# Patient Record
Sex: FEMALE | Race: WHITE | Employment: OTHER | URBAN - NONMETROPOLITAN AREA
[De-identification: names, ages, dates, MRNs, and addresses within clinical notes are randomized per-mention and may not be internally consistent; named-entity substitution may affect disease eponyms.]

---

## 2022-02-15 ENCOUNTER — OFFICE VISIT (OUTPATIENT)
Dept: CARDIOLOGY CLINIC | Age: 67
End: 2022-02-15
Payer: MEDICARE

## 2022-02-15 VITALS
DIASTOLIC BLOOD PRESSURE: 84 MMHG | SYSTOLIC BLOOD PRESSURE: 124 MMHG | BODY MASS INDEX: 34.21 KG/M2 | HEART RATE: 70 BPM | HEIGHT: 67 IN | WEIGHT: 218 LBS | OXYGEN SATURATION: 97 %

## 2022-02-15 DIAGNOSIS — R00.2 PALPITATIONS: Primary | ICD-10-CM

## 2022-02-15 DIAGNOSIS — R01.1 HEART MURMUR: ICD-10-CM

## 2022-02-15 PROCEDURE — 93242 EXT ECG>48HR<7D RECORDING: CPT | Performed by: INTERNAL MEDICINE

## 2022-02-15 PROCEDURE — 1090F PRES/ABSN URINE INCON ASSESS: CPT | Performed by: INTERNAL MEDICINE

## 2022-02-15 PROCEDURE — G8427 DOCREV CUR MEDS BY ELIG CLIN: HCPCS | Performed by: INTERNAL MEDICINE

## 2022-02-15 PROCEDURE — G8484 FLU IMMUNIZE NO ADMIN: HCPCS | Performed by: INTERNAL MEDICINE

## 2022-02-15 PROCEDURE — 93000 ELECTROCARDIOGRAM COMPLETE: CPT | Performed by: INTERNAL MEDICINE

## 2022-02-15 PROCEDURE — G8417 CALC BMI ABV UP PARAM F/U: HCPCS | Performed by: INTERNAL MEDICINE

## 2022-02-15 PROCEDURE — 99204 OFFICE O/P NEW MOD 45 MIN: CPT | Performed by: INTERNAL MEDICINE

## 2022-02-15 RX ORDER — SERTRALINE HYDROCHLORIDE 25 MG/1
25 TABLET, FILM COATED ORAL DAILY
COMMUNITY

## 2022-02-15 RX ORDER — CALCIUM CARBONATE 500(1250)
500 TABLET ORAL DAILY
COMMUNITY

## 2022-02-15 RX ORDER — FOLIC ACID 1 MG/1
1 TABLET ORAL
COMMUNITY

## 2022-02-15 RX ORDER — LEUCOVORIN CALCIUM 5 MG/1
5 TABLET ORAL WEEKLY
COMMUNITY

## 2022-02-15 RX ORDER — ACETAMINOPHEN 160 MG
TABLET,DISINTEGRATING ORAL DAILY
COMMUNITY

## 2022-02-15 RX ORDER — MULTIVIT WITH MINERALS/LUTEIN
250 TABLET ORAL DAILY
COMMUNITY
End: 2022-03-31 | Stop reason: ALTCHOICE

## 2022-02-16 PROBLEM — R00.2 PALPITATIONS: Status: ACTIVE | Noted: 2022-02-16

## 2022-02-16 PROBLEM — R01.1 HEART MURMUR: Status: ACTIVE | Noted: 2022-02-16

## 2022-02-16 ASSESSMENT — ENCOUNTER SYMPTOMS
ANAL BLEEDING: 0
COUGH: 1
VOMITING: 0
NAUSEA: 0
ABDOMINAL PAIN: 0
BLOOD IN STOOL: 0
BACK PAIN: 1
SHORTNESS OF BREATH: 0

## 2022-02-16 NOTE — PROGRESS NOTES
Office Visit  Faiza Talbot is a 73 y.o. female; who present today for New Patient      HPI  I am seeing this 56-year-old white female in office consultation today for the first time on referral from [facility] because of the finding of extrasystoles on several occasions. This patient was never done any cardiac problems in the past.  Basically she has almost no cardiovascular symptoms. She reports that during colonoscopy she was told that her heart was going casa, assuming that there were some irregularities. She was then seen by primary care and apparently on examination extrasellar noted, perhaps every eighth beat during auscultation was noted to be an extrasystole. The EKG from primary care office demonstrated what appeared to be sinus arrhythmia or perhaps PACs and otherwise was normal.  Lab studies were unremarkable performed this month and normal TSH in 2021. She is fairly active and experiences no chest discomfort or dyspnea, no presyncope or syncope. On occasion she may have a sense of a fluttering that is very brief with no associated symptoms. Her medical history is significant for rheumatoid arthritis. She has never required special cardiac testing in the past such as stress, echocardiography or cardiac catheterization.   Current Outpatient Medications   Medication Sig Dispense Refill    sertraline (ZOLOFT) 25 MG tablet Take 25 mg by mouth daily      methotrexate (RHEUMATREX) 2.5 MG chemo tablet Take by mouth once a week Take 5 tablets once a week, on Tuesday      leucovorin calcium (WELLCOVORIN) 5 MG tablet Take 5 mg by mouth once a week Take on Friday      folic acid (FOLVITE) 1 MG tablet Take 1 mg by mouth Take 1 tablet daily besides on Tuesday      Ascorbic Acid (VITAMIN C) 250 MG tablet Take 250 mg by mouth daily      Cholecalciferol (VITAMIN D3) 50 MCG (2000 UT) CAPS Take by mouth daily      calcium carbonate (OSCAL) 500 MG TABS tablet Take 500 mg by mouth daily No current facility-administered medications for this visit. There are no discontinued medications. No Known Allergies    Past Medical History:   Diagnosis Date    Hyperlipidemia     Borderline     Irregular heart beat     Rheumatic arteritis      Negative - Past Medical History for  Past Medical History Pertinent Negatives:   Diagnosis Date Noted    Cerebral artery occlusion with cerebral infarction ([facility] 02/15/2022    CHF (congestive heart failure) ([facility] 02/15/2022    Diabetes mellitus ([facility] 02/15/2022    Hypertension 02/15/2022    Seizures ([facility] 02/15/2022       Past Surgical History:   Procedure Laterality Date    BREAST LUMPECTOMY Right     CARPAL TUNNEL RELEASE Bilateral     COLONOSCOPY      HYSTERECTOMY, TOTAL ABDOMINAL      RECTAL PROLAPSE REPAIR      TONSILLECTOMY       Social History     Occupational History    Not on file   Tobacco Use    Smoking status: Never Smoker    Smokeless tobacco: Never Used   Vaping Use    Vaping Use: Never used   Substance and Sexual Activity    Alcohol use: Not Currently     Comment: rare     Drug use: Not Currently    Sexual activity: Not on file        Family History   Problem Relation Age of Onset    Heart Failure Mother     Heart Attack Mother     Hypertension Mother        Review of Systems  Review of Systems   Constitutional: Negative for fatigue and fever. Respiratory: Positive for cough. Negative for shortness of breath. Gastrointestinal: Negative for abdominal pain, anal bleeding, blood in stool, nausea and vomiting. Genitourinary: Negative for dysuria and hematuria. Musculoskeletal: Positive for arthralgias and back pain. Skin: Negative for rash. Neurological: Negative for facial asymmetry, weakness and headaches. All other systems reviewed and are negative.         Physical Exam  /84   Pulse 70   Ht 5' 7\" (1.702 m)   Wt 218 lb (98.9 kg)   SpO2 97%   BMI 34.14 kg/m²    Physical Exam  Constitutional: Appearance: She is obese. Cardiovascular:      Heart sounds: Murmur heard. Systolic (Early, second costal space) murmur is present with a grade of 1/6. Assessment/Plan    EKG Findings:  Sinus rhythm, 70 bpm, within normal limits    Problem List Items Addressed This Visit     Palpitations - Primary    Relevant Orders    EKG 12 lead (Completed)    ECHO Complete 2D W Doppler W Color    [place] EXTERNAL ECG REC>48HR<7D RECORDING (Completed)    Heart murmur           Diagnosis Orders   1. Palpitations  EKG 12 lead    ECHO Complete 2D W Doppler W Color    [place] EXTERNAL ECG REC>48HR<7D RECORDING    Primarily extrasystoles by examination and monitoring during colonoscopy   2. Heart murmur         Recommendations:   Diet: Low-fat, calorie reduced  Activity: Normal activities  Medication Changes: No medication change    This patient is examined is remarkable. I auscultated her heart for a long period of time and heard only one extrasystole. It is likely that she has PACs, possibly PVCs also and probably benign but needs to evaluate for underlying structural heart disease. She has nothing to suggest ischemic heart disease. I will get an echocardiogram and a 5-day ZIO monitor with further recommendations pending the results. No orders of the defined types were placed in this encounter. Orders Placed This Encounter   Procedures    EKG 12 lead     Order Specific Question:   Reason for Exam?     Answer:   Irregular heart rate    ECHO Complete 2D W Doppler W Color     Standing Status:   Future     Standing Expiration Date:   2/15/2023     Order Specific Question:   Reason for exam:     Answer:   palpitations    [place] EXTERNAL ECG REC>48HR<7D RECORDING       Return in about 6 weeks (around 3/29/2022) for me, test results.

## 2022-02-23 ENCOUNTER — HOSPITAL ENCOUNTER (OUTPATIENT)
Dept: NON INVASIVE DIAGNOSTICS | Age: 67
Discharge: HOME | End: 2022-02-23
Payer: MEDICARE

## 2022-02-23 DIAGNOSIS — R00.2 PALPITATIONS: ICD-10-CM

## 2022-02-23 LAB
LV EF: 58 %
LVEF MODALITY: NORMAL

## 2022-02-23 PROCEDURE — C8929 TTE W OR WO FOL WCON,DOPPLER: HCPCS

## 2022-02-23 PROCEDURE — 6360000004 HC RX CONTRAST MEDICATION: Performed by: INTERNAL MEDICINE

## 2022-02-23 RX ADMIN — PERFLUTREN 1.5 ML: 6.52 INJECTION, SUSPENSION INTRAVENOUS at 14:28

## 2022-03-01 ENCOUNTER — TELEPHONE (OUTPATIENT)
Dept: CARDIOLOGY CLINIC | Age: 67
End: 2022-03-01

## 2022-03-01 NOTE — TELEPHONE ENCOUNTER
----- Message from Jason White DO sent at 3/1/2022 10:40 AM CST -----  Please notify patient that her ZIO monitor demonstrated some extra heartbeats and short runs of fast heartbeat that correlate to some extent with her symptoms and I will discuss in more detail at the time of the follow-up visit.

## 2022-03-31 ENCOUNTER — OFFICE VISIT (OUTPATIENT)
Dept: CARDIOLOGY CLINIC | Age: 67
End: 2022-03-31
Payer: MEDICARE

## 2022-03-31 VITALS
OXYGEN SATURATION: 98 % | DIASTOLIC BLOOD PRESSURE: 82 MMHG | HEART RATE: 68 BPM | HEIGHT: 67 IN | BODY MASS INDEX: 35.63 KG/M2 | SYSTOLIC BLOOD PRESSURE: 116 MMHG | WEIGHT: 227 LBS

## 2022-03-31 DIAGNOSIS — E66.01 MORBID OBESITY (HCC): ICD-10-CM

## 2022-03-31 DIAGNOSIS — R00.2 PALPITATIONS: Primary | ICD-10-CM

## 2022-03-31 DIAGNOSIS — R01.1 HEART MURMUR: ICD-10-CM

## 2022-03-31 PROCEDURE — 1090F PRES/ABSN URINE INCON ASSESS: CPT | Performed by: INTERNAL MEDICINE

## 2022-03-31 PROCEDURE — G8400 PT W/DXA NO RESULTS DOC: HCPCS | Performed by: INTERNAL MEDICINE

## 2022-03-31 PROCEDURE — 1123F ACP DISCUSS/DSCN MKR DOCD: CPT | Performed by: INTERNAL MEDICINE

## 2022-03-31 PROCEDURE — 1036F TOBACCO NON-USER: CPT | Performed by: INTERNAL MEDICINE

## 2022-03-31 PROCEDURE — G8484 FLU IMMUNIZE NO ADMIN: HCPCS | Performed by: INTERNAL MEDICINE

## 2022-03-31 PROCEDURE — 4040F PNEUMOC VAC/ADMIN/RCVD: CPT | Performed by: INTERNAL MEDICINE

## 2022-03-31 PROCEDURE — 3017F COLORECTAL CA SCREEN DOC REV: CPT | Performed by: INTERNAL MEDICINE

## 2022-03-31 PROCEDURE — G8417 CALC BMI ABV UP PARAM F/U: HCPCS | Performed by: INTERNAL MEDICINE

## 2022-03-31 PROCEDURE — G8427 DOCREV CUR MEDS BY ELIG CLIN: HCPCS | Performed by: INTERNAL MEDICINE

## 2022-03-31 PROCEDURE — 99213 OFFICE O/P EST LOW 20 MIN: CPT | Performed by: INTERNAL MEDICINE

## 2022-03-31 RX ORDER — M-VIT,TX,IRON,MINS/CALC/FOLIC 27MG-0.4MG
1 TABLET ORAL DAILY
COMMUNITY

## 2022-03-31 ASSESSMENT — ENCOUNTER SYMPTOMS
SHORTNESS OF BREATH: 0
COUGH: 0
ANAL BLEEDING: 0
BLOOD IN STOOL: 0

## 2022-03-31 NOTE — PROGRESS NOTES
Office Visit  Faiza Talbot is a 73 y.o. female; who present today for Results      HPI  I am seeing this 73-year-old white female in follow-up after having the echocardiogram and ZIO monitor as I ordered based upon the initial visit in this office, seen because extrasystoles were noted on examination and the patient seemed to have some palpitations. I reviewed the results of the testing. Her echocardiogram is basically normal except for borderline dilated left ventricle that could be the result of increased cardiac demand because of obesity but essentially no structural heart disease and nothing to suggest valvular disease or congenital disease to account for her heart murmur. The ZIO monitor for over 5 days demonstrated occasional PVCs at 3.4% with rare PACs, 25 runs of SVT up to about 9 seconds and several short runs of ventricular tachycardia. She had some symptoms with SVT and PVCs but these are infrequent and not very troublesome. She basically feels well with no chest pain or dyspnea, no presyncope or syncope. Current Outpatient Medications   Medication Sig Dispense Refill    Multiple Vitamins-Minerals (THERAPEUTIC MULTIVITAMIN-MINERALS) tablet Take 1 tablet by mouth daily      sertraline (ZOLOFT) 25 MG tablet Take 25 mg by mouth daily      methotrexate (RHEUMATREX) 2.5 MG chemo tablet Take by mouth once a week Take 5 tablets once a week, on Tuesday      leucovorin calcium (WELLCOVORIN) 5 MG tablet Take 5 mg by mouth once a week Take on Friday      folic acid (FOLVITE) 1 MG tablet Take 1 mg by mouth Take 1 tablet daily besides on Tuesday      Cholecalciferol (VITAMIN D3) 50 MCG (2000 UT) CAPS Take by mouth daily      calcium carbonate (OSCAL) 500 MG TABS tablet Take 500 mg by mouth daily       No current facility-administered medications for this visit.       Medications Discontinued During This Encounter   Medication Reason    Ascorbic Acid (VITAMIN C) 250 MG tablet DISCONTINUED BY ANOTHER CLINICIAN        No Known Allergies    Past Medical History:   Diagnosis Date    Hyperlipidemia     Borderline     Irregular heart beat     Rheumatic arteritis      Negative - Past Medical History for  Past Medical History Pertinent Negatives:   Diagnosis Date Noted    Cerebral artery occlusion with cerebral infarction ([facility] 02/15/2022    CHF (congestive heart failure) ([facility] 02/15/2022    Diabetes mellitus ([facility] 02/15/2022    Hypertension 02/15/2022    Seizures ([facility] 02/15/2022       Past Surgical History:   Procedure Laterality Date    BREAST LUMPECTOMY Right     CARPAL TUNNEL RELEASE Bilateral     COLONOSCOPY      HYSTERECTOMY, TOTAL ABDOMINAL      RECTAL PROLAPSE REPAIR      TONSILLECTOMY       Social History     Occupational History    Not on file   Tobacco Use    Smoking status: Never Smoker    Smokeless tobacco: Never Used   Vaping Use    Vaping Use: Never used   Substance and Sexual Activity    Alcohol use: Not Currently     Comment: rare     Drug use: Not Currently    Sexual activity: Not on file        Family History   Problem Relation Age of Onset    Heart Failure Mother     Heart Attack Mother     Hypertension Mother        Review of Systems  Review of Systems   Constitutional: Negative for fatigue and fever. Respiratory: Negative for cough and shortness of breath. Cardiovascular: Positive for palpitations. Negative for chest pain and leg swelling. Gastrointestinal: Negative for anal bleeding and blood in stool. Neurological: Negative for syncope, facial asymmetry and speech difficulty. Physical Exam  /82   Pulse 68   Ht 5' 7\" (1.702 m)   Wt 227 lb (103 kg)   SpO2 98%   BMI 35.55 kg/m²    Physical Exam  Constitutional:       Appearance: Normal appearance. She is morbidly obese. Cardiovascular:      Rate and Rhythm: Normal rate and regular rhythm. Heart sounds: Murmur heard.     Systolic (Early, base of the heart) murmur is present with a grade of 1/6.  No gallop. Pulmonary:      Effort: Pulmonary effort is normal.      Breath sounds: Normal breath sounds. Abdominal:      General: Abdomen is flat. Bowel sounds are normal.      Palpations: Abdomen is soft. Musculoskeletal:         General: No swelling. Skin:     General: Skin is warm and dry. Neurological:      Mental Status: She is alert. Assessment/Plan    EKG Findings:  Not performed today    Problem List Items Addressed This Visit     Palpitations - Primary    Heart murmur    Morbid obesity ([address]           Diagnosis Orders   1. Palpitations      Associated with SVT and PVCs on ZIO monitor   2. Heart murmur      Flow murmur, borderline left ventricular dilation on echo   3. Morbid obesity ([address]         Recommendations:   Diet: Calorie reduced diet to lose weight  Activity: Normal activities  Medication Changes: No medication change    This patient's or not bothersome enough to consider taking beta-blockers but this is an option if her symptoms are more troublesome but if they get worse then she should be seen regarding any change. I have asked her to return in 6 months to see how she is doing and to consider whether an echocardiogram should be repeated in a year. No orders of the defined types were placed in this encounter. No orders of the defined types were placed in this encounter. Return in about 6 months (around 9/30/2022) for APRN, routine.

## 2022-10-03 ENCOUNTER — OFFICE VISIT (OUTPATIENT)
Dept: CARDIOLOGY CLINIC | Age: 67
End: 2022-10-03
Payer: MEDICARE

## 2022-10-03 VITALS
BODY MASS INDEX: 35.31 KG/M2 | HEART RATE: 61 BPM | OXYGEN SATURATION: 98 % | WEIGHT: 225 LBS | HEIGHT: 67 IN | DIASTOLIC BLOOD PRESSURE: 84 MMHG | SYSTOLIC BLOOD PRESSURE: 118 MMHG

## 2022-10-03 DIAGNOSIS — I25.10 CORONARY ARTERY DISEASE INVOLVING NATIVE HEART WITHOUT ANGINA PECTORIS, UNSPECIFIED VESSEL OR LESION TYPE: Primary | ICD-10-CM

## 2022-10-03 PROCEDURE — 1123F ACP DISCUSS/DSCN MKR DOCD: CPT | Performed by: INTERNAL MEDICINE

## 2022-10-03 PROCEDURE — 3017F COLORECTAL CA SCREEN DOC REV: CPT | Performed by: INTERNAL MEDICINE

## 2022-10-03 PROCEDURE — 1090F PRES/ABSN URINE INCON ASSESS: CPT | Performed by: INTERNAL MEDICINE

## 2022-10-03 PROCEDURE — 1036F TOBACCO NON-USER: CPT | Performed by: INTERNAL MEDICINE

## 2022-10-03 PROCEDURE — G8484 FLU IMMUNIZE NO ADMIN: HCPCS | Performed by: INTERNAL MEDICINE

## 2022-10-03 PROCEDURE — G8427 DOCREV CUR MEDS BY ELIG CLIN: HCPCS | Performed by: INTERNAL MEDICINE

## 2022-10-03 PROCEDURE — G8417 CALC BMI ABV UP PARAM F/U: HCPCS | Performed by: INTERNAL MEDICINE

## 2022-10-03 PROCEDURE — 99214 OFFICE O/P EST MOD 30 MIN: CPT | Performed by: INTERNAL MEDICINE

## 2022-10-03 PROCEDURE — G8400 PT W/DXA NO RESULTS DOC: HCPCS | Performed by: INTERNAL MEDICINE

## 2022-10-03 ASSESSMENT — ENCOUNTER SYMPTOMS
APNEA: 0
ABDOMINAL DISTENTION: 0
SHORTNESS OF BREATH: 0
CHEST TIGHTNESS: 0
EYE DISCHARGE: 0
NAUSEA: 0
EYE REDNESS: 0
CONSTIPATION: 0
ABDOMINAL PAIN: 0
WHEEZING: 0
EYE PAIN: 0
BLOOD IN STOOL: 0
FACIAL SWELLING: 0
SORE THROAT: 0
DIARRHEA: 0
COUGH: 0
VOMITING: 0

## 2022-10-03 NOTE — PROGRESS NOTES
Cardiology Office Visit Note  [address]  [address]  Phone: [phone]  Fax: [phone]                            Date:  10/3/2022  Patient: Letitia Banerjee  Age:  73 y. o., 1955    Referral: No ref. provider found    REASON FOR VISIT:  Establish Cardiologist ([place] )         PROBLEM LIST:    Patient Active Problem List    Diagnosis Date Noted    Morbid obesity ([facility] 03/31/2022    Palpitations 02/16/2022     Overview Note:     Primarily extrasystoles by examination and monitoring during colonoscopy      Heart murmur 02/16/2022         PRESENTATION: Letitia Banerjee is a 73y.o. year old female was last seen in this office approximately 6 months ago. She has been managed for palpitations. She is known to have borderline dilated left ventricle related to cardiac obesity. Her last known ejection fraction  normal.  Previous ZIO monitor notable for occasional PVCs with 3.4% burden and bursts of paroxysmal supraventricular tachycardia and several short runs of ventricular tachycardia. Is offered beta-blocker however he declined. Today she reports that she still has palpitations on occasion, random in onset, mostly observed during quiet times. No chest pain, shortness of breath or diaphoresis. She does have some degree of chronic fatigue related to rheumatoid arthritis. REVIEW OF SYSTEMS:  Review of Systems   Constitutional:  Negative for chills, fatigue and fever. HENT:  Negative for congestion, facial swelling, hearing loss and sore throat. Eyes:  Negative for pain, discharge, redness and visual disturbance. Respiratory:  Negative for apnea, cough, chest tightness, shortness of breath and wheezing. Cardiovascular:  Negative for chest pain, palpitations and leg swelling. Gastrointestinal:  Negative for abdominal distention, abdominal pain, blood in stool, constipation, diarrhea, nausea and vomiting.    Endocrine: Negative for polydipsia, polyphagia and polyuria. Genitourinary:  Negative for dysuria, flank pain, frequency and hematuria. Musculoskeletal:  Negative for joint swelling, myalgias and neck pain. Skin:  Negative for pallor and rash. Neurological:  Negative for dizziness, syncope, speech difficulty, light-headedness, numbness and headaches. Psychiatric/Behavioral:  Negative for confusion, hallucinations and sleep disturbance. Past Medical History:      Diagnosis Date    Hyperlipidemia     Borderline     Irregular heart beat     Rheumatic arteritis        Past Surgical History:      Procedure Laterality Date    BREAST LUMPECTOMY Right     CARPAL TUNNEL RELEASE Bilateral     COLONOSCOPY      HYSTERECTOMY, TOTAL ABDOMINAL (CERVIX REMOVED)      RECTAL PROLAPSE REPAIR      TONSILLECTOMY         Medications:  Current Outpatient Medications   Medication Sig Dispense Refill    Multiple Vitamins-Minerals (THERAPEUTIC MULTIVITAMIN-MINERALS) tablet Take 1 tablet by mouth daily      sertraline (ZOLOFT) 25 MG tablet Take 25 mg by mouth daily      methotrexate (RHEUMATREX) 2.5 MG chemo tablet Take by mouth once a week Take 5 tablets once a week, on Tuesday      leucovorin calcium (WELLCOVORIN) 5 MG tablet Take 5 mg by mouth once a week Take on Friday      folic acid (FOLVITE) 1 MG tablet Take 1 mg by mouth Take 1 tablet daily besides on Tuesday      Cholecalciferol (VITAMIN D3) 50 MCG (2000 UT) CAPS Take by mouth daily      calcium carbonate (OSCAL) 500 MG TABS tablet Take 500 mg by mouth daily       No current facility-administered medications for this visit. Allergies:  Patient has no known allergies.     Social History:  Social History     Occupational History    Not on file   Tobacco Use    Smoking status: Never    Smokeless tobacco: Never   Vaping Use    Vaping Use: Never used   Substance and Sexual Activity    Alcohol use: Not Currently     Comment: rare     Drug use: Not Currently    Sexual activity: Not on file         Family History: Problem Relation Age of Onset    Heart Failure Mother     Heart Attack Mother     Hypertension Mother          Physical Examination:  /84   Pulse 61   Ht 5' 7\" (1.702 m)   Wt 225 lb (102.1 kg)   SpO2 98%   BMI 35.24 kg/m²   Physical Exam  Vitals reviewed. Constitutional:       General: She is not in acute distress. Appearance: Normal appearance. She is not ill-appearing, toxic-appearing or diaphoretic. HENT:      Head: Normocephalic and atraumatic. Eyes:      General: No scleral icterus. Right eye: No discharge. Left eye: No discharge. Conjunctiva/sclera: Conjunctivae normal.   Neck:      Vascular: No carotid bruit. Cardiovascular:      Rate and Rhythm: Normal rate and regular rhythm. No extrasystoles are present. Chest Wall: PMI is not displaced. No thrill. Heart sounds: S1 normal and S2 normal. No murmur heard. No friction rub. No gallop. Pulmonary:      Effort: Pulmonary effort is normal. No tachypnea or respiratory distress. Breath sounds: Normal breath sounds. No stridor. No wheezing, rhonchi or rales. Chest:      Chest wall: No tenderness. Abdominal:      General: Bowel sounds are normal. There is no distension. Palpations: Abdomen is soft. There is no mass. Tenderness: There is no abdominal tenderness. There is no guarding. Musculoskeletal:         General: No swelling. Cervical back: Normal range of motion and neck supple. No rigidity. Right lower leg: No edema. Left lower leg: No edema. Skin:     General: Skin is warm and dry. Coloration: Skin is not jaundiced. Findings: No erythema or rash. Neurological:      General: No focal deficit present. Mental Status: She is alert and oriented to person, place, and time. Mental status is at baseline. Psychiatric:         Mood and Affect: Mood normal.         Behavior: Behavior normal.         Thought Content:  Thought content normal. ASSESSMENT and PLAN:    Symptomatic PVCs  Rare runs of ventricular tachycardia, no history of syncope  Paroxysmal bursts of SVT  Last known ejection fraction within normal limits  Obtain most recent lab reports include electrolytes and TSH  CT calcium score for further risk analysis  Further recommendations will follow                Electronically signed by Dayami Webb MD on 10/3/2022 at 10:02 AM    Dayami Webb MD, MBA, [facility]  Noninvasive Cardiology Consultant    This dictation was generated by voice recognition computer software. Although all attempts are made to edit the dictation for accuracy, there may be errors in the transcription that are not intended.

## 2022-11-03 ENCOUNTER — HOSPITAL ENCOUNTER (OUTPATIENT)
Dept: CT IMAGING | Age: 67
Discharge: HOME | End: 2022-11-03
Payer: MEDICARE

## 2022-11-03 DIAGNOSIS — I25.10 CORONARY ARTERY DISEASE INVOLVING NATIVE HEART WITHOUT ANGINA PECTORIS, UNSPECIFIED VESSEL OR LESION TYPE: ICD-10-CM

## 2022-11-03 PROCEDURE — 75571 CT HRT W/O DYE W/CA TEST: CPT

## 2022-11-08 NOTE — RESULT ENCOUNTER NOTE
Mild plaque burden with a coronary calcium score of 15. Would emphasize weight loss by way of heart healthy dieting and exercise (BMI 35, body weight 225 pounds).

## 2022-11-09 ENCOUNTER — TELEPHONE (OUTPATIENT)
Dept: CARDIOLOGY CLINIC | Age: 67
End: 2022-11-09

## 2023-08-15 ENCOUNTER — OFFICE VISIT (OUTPATIENT)
Dept: CARDIOLOGY CLINIC | Age: 68
End: 2023-08-15
Payer: MEDICARE

## 2023-08-15 VITALS
HEART RATE: 63 BPM | HEIGHT: 67 IN | SYSTOLIC BLOOD PRESSURE: 152 MMHG | DIASTOLIC BLOOD PRESSURE: 84 MMHG | BODY MASS INDEX: 34.69 KG/M2 | WEIGHT: 221 LBS

## 2023-08-15 DIAGNOSIS — I25.10 CORONARY ARTERY DISEASE INVOLVING NATIVE HEART WITHOUT ANGINA PECTORIS, UNSPECIFIED VESSEL OR LESION TYPE: ICD-10-CM

## 2023-08-15 DIAGNOSIS — R00.2 PALPITATIONS: Primary | ICD-10-CM

## 2023-08-15 PROCEDURE — 93000 ELECTROCARDIOGRAM COMPLETE: CPT | Performed by: INTERNAL MEDICINE

## 2023-08-15 PROCEDURE — G8400 PT W/DXA NO RESULTS DOC: HCPCS | Performed by: INTERNAL MEDICINE

## 2023-08-15 PROCEDURE — 99214 OFFICE O/P EST MOD 30 MIN: CPT | Performed by: INTERNAL MEDICINE

## 2023-08-15 PROCEDURE — 3017F COLORECTAL CA SCREEN DOC REV: CPT | Performed by: INTERNAL MEDICINE

## 2023-08-15 PROCEDURE — G8417 CALC BMI ABV UP PARAM F/U: HCPCS | Performed by: INTERNAL MEDICINE

## 2023-08-15 PROCEDURE — G8427 DOCREV CUR MEDS BY ELIG CLIN: HCPCS | Performed by: INTERNAL MEDICINE

## 2023-08-15 PROCEDURE — 1090F PRES/ABSN URINE INCON ASSESS: CPT | Performed by: INTERNAL MEDICINE

## 2023-08-15 PROCEDURE — 1123F ACP DISCUSS/DSCN MKR DOCD: CPT | Performed by: INTERNAL MEDICINE

## 2023-08-15 PROCEDURE — 1036F TOBACCO NON-USER: CPT | Performed by: INTERNAL MEDICINE

## 2023-08-15 RX ORDER — SIMVASTATIN 10 MG
10 TABLET ORAL NIGHTLY
COMMUNITY

## 2023-08-15 ASSESSMENT — ENCOUNTER SYMPTOMS
APNEA: 0
VOMITING: 0
CHEST TIGHTNESS: 0
SHORTNESS OF BREATH: 0
ABDOMINAL PAIN: 0
WHEEZING: 0
ABDOMINAL DISTENTION: 0
FACIAL SWELLING: 0
CONSTIPATION: 0
EYE PAIN: 0
SORE THROAT: 0
DIARRHEA: 0
EYE REDNESS: 0
NAUSEA: 0
BLOOD IN STOOL: 0
COUGH: 0
EYE DISCHARGE: 0

## 2023-08-31 ENCOUNTER — SCHEDULED TELEPHONE ENCOUNTER (OUTPATIENT)
Dept: CARDIOLOGY CLINIC | Age: 68
End: 2023-08-31

## 2023-08-31 DIAGNOSIS — I10 PRIMARY HYPERTENSION: Primary | ICD-10-CM

## 2023-08-31 RX ORDER — HYDROCHLOROTHIAZIDE 12.5 MG/1
12.5 CAPSULE, GELATIN COATED ORAL EVERY MORNING
Qty: 30 CAPSULE | Refills: 3 | Status: SHIPPED | OUTPATIENT
Start: 2023-08-31

## 2023-08-31 ASSESSMENT — ENCOUNTER SYMPTOMS
FACIAL SWELLING: 0
EYE REDNESS: 0
ABDOMINAL PAIN: 0
COUGH: 0
EYE PAIN: 0
NAUSEA: 0
DIARRHEA: 0
BLOOD IN STOOL: 0
ABDOMINAL DISTENTION: 0
WHEEZING: 0
CHEST TIGHTNESS: 0
CONSTIPATION: 0
SHORTNESS OF BREATH: 0
SORE THROAT: 0
VOMITING: 0
APNEA: 0
EYE DISCHARGE: 0

## 2023-08-31 NOTE — PROGRESS NOTES
Cardiology Office Visit Note  [address], [address] [address]  Phone: [phone]  Fax: [phone]                            Date:  8/31/2023  Patient: Roberto Aceves  Age:  78 y.o., 1955    Referral: No ref. provider found    REASON FOR VISIT:  Follow-up  Elevated blood pressure      PROBLEM LIST:    Patient Active Problem List    Diagnosis Date Noted    Morbid obesity ([address]) 03/31/2022    Palpitations 02/16/2022     Overview Note:     Primarily extrasystoles by examination and monitoring during colonoscopy        Heart murmur 02/16/2022         PRESENTATION: Roberto Aceves is a 78y.o. year old female was evaluated today via telephone services for further management of elevated blood pressure. At the last office visit patient's blood pressure was noted to be moderately high. She has no prior history of hypertension. She was asked to monitor her blood pressure at home over the next few weeks. Home blood pressure monitoring log has confirmed mildly elevated hypertension with average systolic blood pressure in the 1 40-1 50 range and normal diastolic pressures. Isolated high of 153/91 and low of 129/78 with average heart rate 60 to 83 bpm.  She remains asymptomatic. She has been increasing her exercise and continues to maintain a low-sodium diet. REVIEW OF SYSTEMS:  Review of Systems   Constitutional:  Negative for chills, fatigue and fever. HENT:  Negative for congestion, facial swelling, hearing loss and sore throat. Eyes:  Negative for pain, discharge, redness and visual disturbance. Respiratory:  Negative for apnea, cough, chest tightness, shortness of breath and wheezing. Cardiovascular:  Negative for chest pain, palpitations and leg swelling. Gastrointestinal:  Negative for abdominal distention, abdominal pain, blood in stool, constipation, diarrhea, nausea and vomiting. Endocrine: Negative for polydipsia, polyphagia and polyuria.    Genitourinary:

## 2023-10-26 ENCOUNTER — OFFICE VISIT (OUTPATIENT)
Dept: CARDIOLOGY CLINIC | Age: 68
End: 2023-10-26

## 2023-10-26 VITALS
DIASTOLIC BLOOD PRESSURE: 90 MMHG | WEIGHT: 215 LBS | SYSTOLIC BLOOD PRESSURE: 146 MMHG | HEIGHT: 67 IN | HEART RATE: 92 BPM | BODY MASS INDEX: 33.74 KG/M2

## 2023-10-26 DIAGNOSIS — I10 PRIMARY HYPERTENSION: Primary | ICD-10-CM

## 2023-10-26 RX ORDER — HYDROCHLOROTHIAZIDE 12.5 MG/1
12.5 CAPSULE, GELATIN COATED ORAL EVERY MORNING
Qty: 30 CAPSULE | Refills: 3 | Status: SHIPPED | OUTPATIENT
Start: 2023-10-26

## 2023-10-26 NOTE — PROGRESS NOTES
Cardiology Office Visit Note  [address], [address] [address]  Phone: [phone]  Fax: [phone]                            Date:  10/26/2023  Patient: Bertrand Theodore  Age:  78 y.o., 1955    Referral: No ref. provider found    REASON FOR VISIT:  Follow-up         PROBLEM LIST:    Patient Active Problem List    Diagnosis Date Noted    Morbid obesity ([address]) 03/31/2022    Palpitations 02/16/2022     Overview Note:     Primarily extrasystoles by examination and monitoring during colonoscopy      Heart murmur 02/16/2022         PRESENTATION: Bertrand Theodore is a 78y.o. year old female is seen today for routine cardiology follow-up appointment. Patient's last appointment was in August of this year. She is known to have essential hypertension. Her blood pressure at home has been averaging 925 systolic and 70 diastolic. No new chest pain, shortness of breath, diaphoresis or fatigue. She has been increasing her physical activity at least 2-3 times per week, 15 to 20-minute sessions usually including yard work. She is compliant with her medications which include low-dose hydrochlorothiazide and simvastatin. REVIEW OF SYSTEMS:  Review of Systems   Constitutional:  Negative for chills, fatigue and fever. HENT:  Negative for congestion, facial swelling, hearing loss and sore throat. Eyes:  Negative for pain, discharge, redness and visual disturbance. Respiratory:  Negative for apnea, cough, chest tightness, shortness of breath and wheezing. Cardiovascular:  Negative for chest pain, palpitations and leg swelling. Gastrointestinal:  Negative for abdominal distention, abdominal pain, blood in stool, constipation, diarrhea, nausea and vomiting. Endocrine: Negative for polydipsia, polyphagia and polyuria. Genitourinary:  Negative for dysuria, flank pain, frequency and hematuria. Musculoskeletal:  Negative for joint swelling, myalgias and neck pain.    Skin:  Negative for

## 2023-10-27 ASSESSMENT — ENCOUNTER SYMPTOMS
DIARRHEA: 0
COUGH: 0
WHEEZING: 0
APNEA: 0
CHEST TIGHTNESS: 0
EYE PAIN: 0
SORE THROAT: 0
EYE DISCHARGE: 0
ABDOMINAL PAIN: 0
FACIAL SWELLING: 0
CONSTIPATION: 0
SHORTNESS OF BREATH: 0
NAUSEA: 0
VOMITING: 0
EYE REDNESS: 0
ABDOMINAL DISTENTION: 0
BLOOD IN STOOL: 0

## 2024-01-24 RX ORDER — HYDROCHLOROTHIAZIDE 12.5 MG/1
12.5 CAPSULE, GELATIN COATED ORAL EVERY MORNING
Qty: 90 CAPSULE | Refills: 3 | Status: SHIPPED | OUTPATIENT
Start: 2024-01-24 | End: 2024-01-25 | Stop reason: SDUPTHER

## 2024-01-25 RX ORDER — HYDROCHLOROTHIAZIDE 12.5 MG/1
12.5 CAPSULE, GELATIN COATED ORAL EVERY MORNING
Qty: 90 CAPSULE | Refills: 3 | Status: SHIPPED | OUTPATIENT
Start: 2024-01-25

## 2024-04-25 ENCOUNTER — OFFICE VISIT (OUTPATIENT)
Dept: CARDIOLOGY CLINIC | Age: 69
End: 2024-04-25
Payer: MEDICARE

## 2024-04-25 VITALS
DIASTOLIC BLOOD PRESSURE: 66 MMHG | HEIGHT: 67 IN | HEART RATE: 73 BPM | WEIGHT: 213 LBS | BODY MASS INDEX: 33.43 KG/M2 | SYSTOLIC BLOOD PRESSURE: 130 MMHG | OXYGEN SATURATION: 97 %

## 2024-04-25 DIAGNOSIS — I10 PRIMARY HYPERTENSION: Primary | ICD-10-CM

## 2024-04-25 PROCEDURE — 99214 OFFICE O/P EST MOD 30 MIN: CPT | Performed by: INTERNAL MEDICINE

## 2024-04-25 PROCEDURE — 3075F SYST BP GE 130 - 139MM HG: CPT | Performed by: INTERNAL MEDICINE

## 2024-04-25 PROCEDURE — 1090F PRES/ABSN URINE INCON ASSESS: CPT | Performed by: INTERNAL MEDICINE

## 2024-04-25 PROCEDURE — G8427 DOCREV CUR MEDS BY ELIG CLIN: HCPCS | Performed by: INTERNAL MEDICINE

## 2024-04-25 PROCEDURE — G8400 PT W/DXA NO RESULTS DOC: HCPCS | Performed by: INTERNAL MEDICINE

## 2024-04-25 PROCEDURE — 1036F TOBACCO NON-USER: CPT | Performed by: INTERNAL MEDICINE

## 2024-04-25 PROCEDURE — G8417 CALC BMI ABV UP PARAM F/U: HCPCS | Performed by: INTERNAL MEDICINE

## 2024-04-25 PROCEDURE — 3078F DIAST BP <80 MM HG: CPT | Performed by: INTERNAL MEDICINE

## 2024-04-25 PROCEDURE — 1123F ACP DISCUSS/DSCN MKR DOCD: CPT | Performed by: INTERNAL MEDICINE

## 2024-04-25 PROCEDURE — 3017F COLORECTAL CA SCREEN DOC REV: CPT | Performed by: INTERNAL MEDICINE

## 2024-04-25 RX ORDER — PHENOL 1.4 %
2 AEROSOL, SPRAY (ML) MUCOUS MEMBRANE DAILY
COMMUNITY

## 2024-04-25 RX ORDER — HYDROCHLOROTHIAZIDE 12.5 MG/1
12.5 CAPSULE, GELATIN COATED ORAL EVERY MORNING
Qty: 90 CAPSULE | Refills: 3 | Status: SHIPPED | OUTPATIENT
Start: 2024-04-25 | End: 2024-04-26

## 2024-04-25 RX ORDER — LORATADINE 10 MG/1
10 CAPSULE, LIQUID FILLED ORAL DAILY
COMMUNITY

## 2024-04-25 RX ORDER — CLONIDINE HYDROCHLORIDE 0.1 MG/1
0.1 TABLET ORAL 2 TIMES DAILY PRN
Qty: 60 TABLET | Refills: 3 | Status: SHIPPED | OUTPATIENT
Start: 2024-04-25

## 2024-04-25 ASSESSMENT — ENCOUNTER SYMPTOMS
ABDOMINAL DISTENTION: 0
COUGH: 0
FACIAL SWELLING: 0
WHEEZING: 0
EYE DISCHARGE: 0
CONSTIPATION: 0
APNEA: 0
DIARRHEA: 0
NAUSEA: 0
VOMITING: 0
BLOOD IN STOOL: 0
EYE REDNESS: 0
ABDOMINAL PAIN: 0
CHEST TIGHTNESS: 0
SHORTNESS OF BREATH: 0
EYE PAIN: 0

## 2024-04-25 NOTE — PROGRESS NOTES
Negative for joint swelling, myalgias and neck pain.   Skin:  Negative for pallor and rash.   Neurological:  Negative for dizziness, syncope, speech difficulty, light-headedness, numbness and headaches.   Psychiatric/Behavioral:  Negative for confusion, hallucinations and sleep disturbance.        Past Medical History:      Diagnosis Date    Hyperlipidemia     Borderline     Irregular heart beat     Osteoarthritis 2020    Rheumatic arteritis        Past Surgical History:      Procedure Laterality Date    BREAST LUMPECTOMY Right     CARPAL TUNNEL RELEASE Bilateral     COLONOSCOPY      HYSTERECTOMY, TOTAL ABDOMINAL (CERVIX REMOVED)      RECTAL PROLAPSE REPAIR      TONSILLECTOMY         Medications:  Current Outpatient Medications   Medication Sig Dispense Refill    calcium carbonate 600 MG TABS tablet Take 2 tablets by mouth daily      loratadine (CLARITIN) 10 MG capsule Take 1 capsule by mouth daily      hydroCHLOROthiazide 12.5 MG capsule Take 1 capsule by mouth every morning 90 capsule 3    simvastatin (ZOCOR) 10 MG tablet Take 1 tablet by mouth nightly      Multiple Vitamins-Minerals (THERAPEUTIC MULTIVITAMIN-MINERALS) tablet Take 1 tablet by mouth daily      sertraline (ZOLOFT) 25 MG tablet Take 1 tablet by mouth daily      methotrexate (RHEUMATREX) 2.5 MG chemo tablet Take by mouth once a week Take 5 tablets once a week, on Tuesday      leucovorin calcium (WELLCOVORIN) 5 MG tablet Take 1 tablet by mouth once a week Take on Friday      folic acid (FOLVITE) 1 MG tablet Take 1 tablet by mouth Take 1 tablet daily besides on Tuesday      Cholecalciferol (VITAMIN D3) 50 MCG (2000 UT) CAPS Take by mouth daily       No current facility-administered medications for this visit.       Allergies:  Patient has no known allergies.    Social History:  Social History     Occupational History    Not on file   Tobacco Use    Smoking status: Never    Smokeless tobacco: Never   Vaping Use    Vaping Use: Never used   Substance and

## 2024-04-26 RX ORDER — HYDROCHLOROTHIAZIDE 12.5 MG/1
25 CAPSULE, GELATIN COATED ORAL EVERY MORNING
Qty: 180 CAPSULE | Refills: 3 | Status: SHIPPED | OUTPATIENT
Start: 2024-04-26 | End: 2025-04-21

## 2024-10-29 ENCOUNTER — OFFICE VISIT (OUTPATIENT)
Dept: CARDIOLOGY CLINIC | Age: 69
End: 2024-10-29

## 2024-10-29 VITALS
HEART RATE: 80 BPM | WEIGHT: 216 LBS | HEIGHT: 66 IN | DIASTOLIC BLOOD PRESSURE: 70 MMHG | SYSTOLIC BLOOD PRESSURE: 120 MMHG | BODY MASS INDEX: 34.72 KG/M2 | OXYGEN SATURATION: 98 %

## 2024-10-29 DIAGNOSIS — R00.2 PALPITATIONS: Primary | ICD-10-CM

## 2024-10-29 RX ORDER — CLONIDINE HYDROCHLORIDE 0.1 MG/1
0.1 TABLET ORAL 2 TIMES DAILY
Qty: 60 TABLET | Refills: 3 | Status: SHIPPED | OUTPATIENT
Start: 2024-10-29 | End: 2024-10-29

## 2024-10-29 RX ORDER — CLONIDINE HYDROCHLORIDE 0.1 MG/1
0.1 TABLET ORAL 2 TIMES DAILY
Qty: 180 TABLET | Refills: 3 | Status: SHIPPED | OUTPATIENT
Start: 2024-10-29 | End: 2025-01-27

## 2024-10-29 ASSESSMENT — ENCOUNTER SYMPTOMS
EYE PAIN: 0
NAUSEA: 0
SHORTNESS OF BREATH: 0
BLOOD IN STOOL: 0
ABDOMINAL DISTENTION: 0
VOMITING: 0
EYE DISCHARGE: 0
WHEEZING: 0
COUGH: 0
CONSTIPATION: 0
DIARRHEA: 0
APNEA: 0
CHEST TIGHTNESS: 0
FACIAL SWELLING: 0
EYE REDNESS: 0
ABDOMINAL PAIN: 0
SORE THROAT: 0

## 2024-10-29 NOTE — PROGRESS NOTES
Cardiology Office Visit Note  [address] [address], [facility]  [address]  Phone: [phone]  Fax: [phone]                            Date:  10/29/2024  Patient: Stefany Wolff  Age:  68 y.o., 1955    Referral: No ref. provider found    REASON FOR VISIT:  Follow-up visit      PROBLEM LIST:    Patient Active Problem List    Diagnosis Date Noted    Morbid obesity 03/31/2022    Palpitations 02/16/2022     Overview Note:     Primarily extrasystoles by examination and monitoring during colonoscopy      Heart murmur 02/16/2022         PRESENTATION: Stefany Wolff is a 68 y.o. year old female returns today for an interim cardiology follow-up appointment.  Last office visit her blood pressure was fluctuating which she attributed to personal stress which has since improved.  Blood pressure at home has been more favorable.  She has been exercising most days 10 to 15 minutes at a given time without any new or persistently worsening symptoms.  She is compliant with her medications.    REVIEW OF SYSTEMS:  Review of Systems   Constitutional:  Negative for chills, fatigue and fever.   HENT:  Negative for congestion, facial swelling, hearing loss and sore throat.    Eyes:  Negative for pain, discharge, redness and visual disturbance.   Respiratory:  Negative for apnea, cough, chest tightness, shortness of breath and wheezing.    Cardiovascular:  Negative for chest pain, palpitations and leg swelling.   Gastrointestinal:  Negative for abdominal distention, abdominal pain, blood in stool, constipation, diarrhea, nausea and vomiting.   Endocrine: Negative for polydipsia, polyphagia and polyuria.   Genitourinary:  Negative for dysuria, flank pain, frequency and hematuria.   Musculoskeletal:  Negative for joint swelling, myalgias and neck pain.   Skin:  Negative for pallor and rash.   Neurological:  Negative for dizziness, syncope, speech difficulty, light-headedness, numbness and headaches.

## 2025-02-06 RX ORDER — CLONIDINE HYDROCHLORIDE 0.1 MG/1
0.1 TABLET ORAL 2 TIMES DAILY
Qty: 180 TABLET | Refills: 1 | Status: SHIPPED | OUTPATIENT
Start: 2025-02-06 | End: 2025-08-05

## 2025-05-08 ENCOUNTER — OFFICE VISIT (OUTPATIENT)
Dept: CARDIOLOGY CLINIC | Age: 70
End: 2025-05-08
Payer: MEDICARE

## 2025-05-08 VITALS
DIASTOLIC BLOOD PRESSURE: 80 MMHG | WEIGHT: 214 LBS | BODY MASS INDEX: 33.59 KG/M2 | SYSTOLIC BLOOD PRESSURE: 138 MMHG | HEART RATE: 101 BPM | HEIGHT: 67 IN

## 2025-05-08 DIAGNOSIS — I25.10 CORONARY ARTERY DISEASE INVOLVING NATIVE HEART WITHOUT ANGINA PECTORIS, UNSPECIFIED VESSEL OR LESION TYPE: Primary | ICD-10-CM

## 2025-05-08 PROCEDURE — G8417 CALC BMI ABV UP PARAM F/U: HCPCS | Performed by: INTERNAL MEDICINE

## 2025-05-08 PROCEDURE — 1160F RVW MEDS BY RX/DR IN RCRD: CPT | Performed by: INTERNAL MEDICINE

## 2025-05-08 PROCEDURE — 1036F TOBACCO NON-USER: CPT | Performed by: INTERNAL MEDICINE

## 2025-05-08 PROCEDURE — 99214 OFFICE O/P EST MOD 30 MIN: CPT | Performed by: INTERNAL MEDICINE

## 2025-05-08 PROCEDURE — 1090F PRES/ABSN URINE INCON ASSESS: CPT | Performed by: INTERNAL MEDICINE

## 2025-05-08 PROCEDURE — G8400 PT W/DXA NO RESULTS DOC: HCPCS | Performed by: INTERNAL MEDICINE

## 2025-05-08 PROCEDURE — 3017F COLORECTAL CA SCREEN DOC REV: CPT | Performed by: INTERNAL MEDICINE

## 2025-05-08 PROCEDURE — G8427 DOCREV CUR MEDS BY ELIG CLIN: HCPCS | Performed by: INTERNAL MEDICINE

## 2025-05-08 PROCEDURE — 1123F ACP DISCUSS/DSCN MKR DOCD: CPT | Performed by: INTERNAL MEDICINE

## 2025-05-08 PROCEDURE — 1159F MED LIST DOCD IN RCRD: CPT | Performed by: INTERNAL MEDICINE

## 2025-05-08 ASSESSMENT — ENCOUNTER SYMPTOMS
CONSTIPATION: 0
ABDOMINAL PAIN: 0
WHEEZING: 0
COUGH: 0
EYE REDNESS: 0
ABDOMINAL DISTENTION: 0
NAUSEA: 0
SORE THROAT: 0
BLOOD IN STOOL: 0
EYE PAIN: 0
VOMITING: 0
FACIAL SWELLING: 0
APNEA: 0
DIARRHEA: 0
EYE DISCHARGE: 0
SHORTNESS OF BREATH: 0

## 2025-05-08 NOTE — PROGRESS NOTES
Cardiology Office Visit Note  [address] [facility] [address] [place] [address]  [address]  Phone: [phone]  Fax: [phone]                            Date:  5/8/2025  Patient: Stefany Wolff  Age:  69 y.o., 1955    Referral: No ref. provider found    REASON FOR VISIT:  Follow-up visit      PROBLEM LIST:  Patient Active Problem List    Diagnosis Date Noted    Morbid obesity (HCC) 03/31/2022    Palpitations 02/16/2022     Overview Note:     Primarily extrasystoles by examination and monitoring during colonoscopy      Heart murmur 02/16/2022       ASSESSMENT PLAN:  Assessment & Plan  Chronic essential hypertension with component of whitecoat hypertension  Chronic hypertensive heart disease--mild concentric left ventricular hypertrophy on echo in 2022  Home blood pressure monitoring   Low-sodium diet, less than 2 g per 24 hours  Goal blood pressure less than 130/80  Clonidine 0.1 mg p.o. twice daily     Asymptomatic PVCs  Rare runs of ventricular tachycardia, no history of syncope  Paroxysmal bursts of SVT, asymptomatic  Last known ejection fraction within normal limits  Continue conservative medical management     Chronic mild coronary artery disease as evidenced by abnormal CT calcium score  Chronic dyslipidemia  Obesity, BMI 33.4  Lifestyle modification  Heart healthy diet and exercise as tolerated.   Continue with simvastatin for goal LDL 50-70  LFT and lipid monitoring as per PCP            PRESENTATION:   History of Present Illness    Ms. Dang returns today for an interim cardiology follow-up appointment.  She is a 69-year-old female who has known mild coronary artery disease.  Overall she has been doing well without any new or progressively worsening cardiopulmonary symptoms.  She admits that she does not exercise enough and spends a fair amount of time sedentary due to her job which requires mostly sitting.  No complaints concerning her medication regimen, reporting no side effects.    REVIEW OF

## 2025-07-16 RX ORDER — CLONIDINE HYDROCHLORIDE 0.1 MG/1
0.1 TABLET ORAL 2 TIMES DAILY
Qty: 180 TABLET | Refills: 3 | Status: SHIPPED | OUTPATIENT
Start: 2025-07-16